# Patient Record
Sex: FEMALE | URBAN - METROPOLITAN AREA
[De-identification: names, ages, dates, MRNs, and addresses within clinical notes are randomized per-mention and may not be internally consistent; named-entity substitution may affect disease eponyms.]

---

## 2018-04-20 ENCOUNTER — TELEPHONE (OUTPATIENT)
Dept: PAIN MEDICINE | Facility: MEDICAL CENTER | Age: 78
End: 2018-04-20

## 2018-04-20 NOTE — TELEPHONE ENCOUNTER
[id number]- patient called to schedule  Intake completed and sent to [place] office  Patient to have order from PCP and prior pain records sent for review from Dr Chavo Aguila  Patient stated she disagrees w/ current pain doctors plan of care

## 2018-04-25 ENCOUNTER — TELEPHONE (OUTPATIENT)
Dept: PAIN MEDICINE | Facility: CLINIC | Age: 78
End: 2018-04-25

## 2018-04-25 NOTE — TELEPHONE ENCOUNTER
Intake completed s/w nurse Bertha Felix at Dr Michael Ambriz office she states patient was not discharged last seen on 04/19/18  Waiting on physician order and prior pain management records

## 2018-05-01 ENCOUNTER — TELEPHONE (OUTPATIENT)
Dept: PAIN MEDICINE | Facility: CLINIC | Age: 78
End: 2018-05-01

## 2018-05-01 NOTE — TELEPHONE ENCOUNTER
Dr Tyrone Phillip reviewed prior pain management records and states has nothing to offer pt to f/u with pcp  Pt non compliat with ct L spine refusing injections  Lmom for pt to advise